# Patient Record
(demographics unavailable — no encounter records)

---

## 2024-10-30 NOTE — HISTORY OF PRESENT ILLNESS
[Hepatitis B Vaccine Given] : Hepatitis B vaccine given [Breast milk] : breast milk [Expressed Breast milk ___oz/feed] : [unfilled] oz of expressed breast milk per feed [Formula ___ oz/feed] : [unfilled] oz of formula per feed [Nirsevimab Given] : Nirsevimab not given [FreeTextEntry7] : discharged 10/23 [FreeTextEntry1] : Term 39.1week male infant born via rpt  to a 34y/o  mother. Maternal history of D+C, umbilical hernia repair, and csection x4. Apgars were 9 and 9 at 1 and 5 minutes respectively. On physical exam, infant was noted to have a skin tag to below left breast. Hepatitis B vaccine was given. Beyfortus was declined. Passed hearing B/L. Maternal blood type A+. Transcutaneous bilirubin at 24 hrs was 5.0, PT 12.8. Prenatal labs were negative, except GBS positive, ROM at delivery. Maternal UDS negative. Congenital heart disease screening was passed. [facility] [place] Screening #[phone].  G6PD 16.7, wnl.  Circumcision done.  beyfortus?  Ht: 51cm/61% Wt: 3220g/35% HC: 33cm/13% DC Wt:  3140g

## 2024-11-22 NOTE — DISCUSSION/SUMMARY
[FreeTextEntry1] : BRADLEY is a 1 month  M with acute uri.   URI: Recommend supportive care including antipyretics, fluids, OTC cough/cold medications if age-appropriate, and nasal saline followed by nasal suction. Alarming breathing patterns such as retractions discussed and educational video reviewed. Patient to be brought to the ED if has persistent decreased oral intake, decrease in wet diapers, fever >100.4F or becomes patient becomes lethargic or changed in mental status and alertness. To note if fever > 5 days must be seen immediately either in clinic or in ED.   Caretaker expressed understanding of the plan and agrees. No other concerns or questions today.

## 2024-11-22 NOTE — HISTORY OF PRESENT ILLNESS
[FreeTextEntry6] : few day hx of runny nose and congestion no fever siblings with uri symptoms Good activity, hydration and po intake. Acting at baseline otherwise.

## 2024-12-09 NOTE — PHYSICAL EXAM
[Alert] : alert [Normocephalic] : normocephalic [Flat Open Anterior [place]] : flat open anterior fontanelle [PERRL] : PERRL [Red Reflex Bilateral] : red reflex bilateral [Normally Placed Ears] : normally placed ears [Auricles Well Formed] : auricles well formed [Nares Patent] : nares patent [Palate Intact] : palate intact [Uvula Midline] : uvula midline [Supple, full passive range of motion] : supple, full passive range of motion [Symmetric Chest Rise] : symmetric chest rise [Clear to Auscultation Bilaterally] : clear to auscultation bilaterally [Regular Rate and Rhythm] : regular rate and rhythm [S1, S2 present] : S1, S2 present [+2 Femoral Pulses] : +2 femoral pulses [Soft] : soft [Bowel Sounds] : bowel sounds present [Normal external genitailia] : normal external genitalia [Circumcised] : circumcised [Central Urethral Opening] : central urethral opening [Testicles Descended Bilaterally] : testicles descended bilaterally [Normally Placed] : normally placed [No Abnormal Lymph Nodes Palpated] : no abnormal lymph nodes palpated [Symmetric Flexed Extremities] : symmetric flexed extremities [Startle Reflex] : startle reflex present [Suck Reflex] : suck reflex present [Rooting] : rooting reflex present [Palmar Grasp] : palmar grasp reflex present [Plantar Grasp] : plantar grasp reflex present [Symmetric [place]] : symmetric [place] [Acute Distress] : no acute distress [Preauricular Sinus Tract] : no preauricular sinus tract [Discharge] : no discharge [Palpable Masses] : no palpable masses [Murmurs] : no murmurs [Tender] : nontender [Distended] : not distended [Hepatomegaly] : no hepatomegaly [Splenomegaly] : no splenomegaly [Bullock-Ortolani] : negative Bullock-Ortolani [Spinal Dimple] : no spinal dimple [Tuft of Hair] : no tuft of hair [Jaundice] : no jaundice [Rash and/or lesion present] : no rash/lesion [FreeTextEntry9] : reducible umbilical hernia

## 2024-12-09 NOTE — DISCUSSION/SUMMARY
[Anticipatory Guidance Given] : Anticipatory guidance addressed as per the history of present illness section [Parental Well-Being] : parental well-being [Family Adjustment] : family adjustment [Feeding Routines] : feeding routines [Infant Adjustment] : infant adjustment [Safety] : safety [FreeTextEntry1] : 1 month old F presenting for HCM. Growth and development normal. Maternal depression screen passed. Immunizations UTD.  - Routine care & anticipatory guidance given - Continue ad joseph feeds - RTC for 2 month old HCM and prn  Caretaker expressed understanding of the plan and agrees. All questions were answered.

## 2024-12-09 NOTE — HISTORY OF PRESENT ILLNESS
[Mother] : mother [Breast milk] : breast milk [Formula ___ oz/feed] : [unfilled] oz of formula per feed [Normal] : Normal [In Bassinet/Crib] : sleeps in bassinet/crib [On back] : sleeps on back [No] : No cigarette smoke exposure [Rear facing car seat in back seat] : Rear facing car seat in back seat [Smoke Detectors] : Smoke detectors at home. [NO] : No [Pacifier use] : Pacifier use [Hours between feeds ___] : Child is fed every [unfilled] hours [Vitamins ___] : Patient takes [unfilled] vitamins daily [Co-sleeping] : no co-sleeping [Loose bedding, pillow, toys, and/or bumpers in crib] : no loose bedding, pillow, toys, and/or bumpers in crib [Exposure to electronic nicotine delivery system] : No exposure to electronic nicotine delivery system

## 2024-12-16 NOTE — PHYSICAL EXAM
[Congestion] : congestion [Transmitted Upper Airway Sounds] : transmitted upper airway sounds [NL] : warm, clear [FreeTextEntry7] : no increased WOB, no respiratory distress

## 2024-12-16 NOTE — HISTORY OF PRESENT ILLNESS
[de-identified] : sick [FreeTextEntry6] : 56d/o M p/w cough since yesterday.  No increased WOB, retractions, flaring, cyanosis.  Mom trying saline nebs and nasal saline with suction.  There was an outbreak of RSV at brother's  and mom would like Alimamy tested.

## 2024-12-16 NOTE — HISTORY OF PRESENT ILLNESS
[de-identified] : sick [FreeTextEntry6] : 56d/o M p/w cough since yesterday.  No increased WOB, retractions, flaring, cyanosis.  Mom trying saline nebs and nasal saline with suction.  There was an outbreak of RSV at brother's  and mom would like Alimamy tested.

## 2024-12-16 NOTE — DISCUSSION/SUMMARY
[FreeTextEntry1] : Rapid RSV is positive.  Lungs are clear today with no respiratory distress.  Saline nebs and nasal saline with suction.  Must return for any signs of worsening or persist - Return precautions reviewed. Patient to seek medical attention in ED if has decreased oral intake, decrease in wet diapers/voids, fever >100.4F, difficulty breathing, becomes lethargic, or has a change in mental status or alertness. To note if fever > 5 days must be seen immediately either in clinic or in ED. - Return/ED precautions given.  RTC routine and prn.  Caretaker expressed understanding and all questions answered.

## 2025-01-06 NOTE — PHYSICAL EXAM
[Alert] : alert [Normocephalic] : normocephalic [Flat Open Anterior [place]] : flat open anterior fontanelle [PERRL] : PERRL [Red Reflex Bilateral] : red reflex bilateral [Normally Placed Ears] : normally placed ears [Auricles Well Formed] : auricles well formed [Clear Tympanic membranes] : clear tympanic membranes [Light reflex present] : light reflex present [Bony landmarks visible] : bony landmarks visible [Nares Patent] : nares patent [Palate Intact] : palate intact [Uvula Midline] : uvula midline [Supple, full passive range of motion] : supple, full passive range of motion [Symmetric Chest Rise] : symmetric chest rise [Clear to Auscultation Bilaterally] : clear to auscultation bilaterally [Regular Rate and Rhythm] : regular rate and rhythm [S1, S2 present] : S1, S2 present [+2 Femoral Pulses] : +2 femoral pulses [Soft] : soft [Bowel Sounds] : bowel sounds present [Normal external genitailia] : normal external genitalia [Circumcised] : circumcised [Central Urethral Opening] : central urethral opening [Testicles Descended Bilaterally] : testicles descended bilaterally [Normally Placed] : normally placed [No Abnormal Lymph Nodes Palpated] : no abnormal lymph nodes palpated [Symmetric Flexed Extremities] : symmetric flexed extremities [Startle Reflex] : startle reflex present [Suck Reflex] : suck reflex present [Rooting] : rooting reflex present [Palmar Grasp] : palmar grasp reflex present [Plantar Grasp] : plantar grasp reflex present [Symmetric [place]ico[place]] : symmetric [place] [Acute Distress] : no acute distress [Discharge] : no discharge [Palpable Masses] : no palpable masses [Murmurs] : no murmurs [Tender] : nontender [Distended] : not distended [Hepatomegaly] : no hepatomegaly [Splenomegaly] : no splenomegaly [Bullock-Ortolani] : negative Bullock-Ortolani [Spinal Dimple] : no spinal dimple [Tuft of Hair] : no tuft of hair [Rash and/or lesion present] : no rash/lesion [FreeTextEntry9] : reducible umbilical hernia

## 2025-01-06 NOTE — HISTORY OF PRESENT ILLNESS
[Mother] : mother [Breast milk] : breast milk [Formula ___ oz/feed] : [unfilled] oz of formula per feed [Vitamins ___] : Patient takes [unfilled] vitamins daily [Normal] : Normal [In Bassinet/Crib] : sleeps in bassinet/crib [On back] : sleeps on back [Pacifier use] : Pacifier use [No] : No cigarette smoke exposure [Rear facing car seat in back seat] : Rear facing car seat in back seat [Smoke Detectors] : Smoke detectors at home. [NO] : No [Carbon Monoxide Detectors] : Carbon monoxide detectors at home [Co-sleeping] : no co-sleeping [Loose bedding, pillow, toys, and/or bumpers in crib] : no loose bedding, pillow, toys, and/or bumpers in crib [Exposure to electronic nicotine delivery system] : No exposure to electronic nicotine delivery system [FreeTextEntry7] : Recently sick with RSV bronchiolitis.  They went to ED 12/19 and CXR was done showed hyperinflation, duoneb given and improved.  He was getting zarbees and later prescribed albuterol.  He is much better now.  Per mom he never stopped eating or drinking. [de-identified] : no major issues

## 2025-01-06 NOTE — DISCUSSION/SUMMARY
[Anticipatory Guidance Given] : Anticipatory guidance addressed as per the history of present illness section [Parental (Maternal) Well-Being] : parental (maternal) well-being [Infant-Family Synchrony] : infant-family synchrony [Nutritional Adequacy] : nutritional adequacy [Infant Behavior] : infant behavior [Safety] : safety [] : The components of the vaccine(s) to be administered today are listed in the plan of care. The disease(s) for which the vaccine(s) are intended to prevent and the risks have been discussed with the caretaker.  The risks are also included in the appropriate vaccination information statements which have been provided to the patient's caregiver.  The caregiver has given consent to vaccinate. [FreeTextEntry1] : 2 month old F presenting for HCM. Growth and development normal. PE w/reducible umbilical hernia. Maternal depression screen passed. Immunizations due.  NBS neg.  Also ED follow up of RSV bronchiolitis.  Improved.  Well appearing on exam.  - Routine care & anticipatory guidance given - Vaccines given:  Vaxelis, PCV, Rota - Post vaccine care discussed & potential side effects reviewed - Tylenol every 4 hours prn for fever or pain - Continue ad joseph feeds - RTC for 4 month old HCM and prn  Caretaker expressed understanding of the plan and agrees. All questions were answered.

## 2025-02-24 NOTE — PHYSICAL EXAM
[Alert] : alert [Normocephalic] : normocephalic [Flat Open Anterior [place]] : flat open anterior fontanelle [Red Reflex] : red reflex bilateral [PERRL] : PERRL [Normally Placed Ears] : normally placed ears [Auricles Well Formed] : auricles well formed [Clear Tympanic membranes] : clear tympanic membranes [Light reflex present] : light reflex present [Bony landmarks visible] : bony landmarks visible [Nares Patent] : nares patent [Palate Intact] : palate intact [Uvula Midline] : uvula midline [Symmetric Chest Rise] : symmetric chest rise [Clear to Auscultation Bilaterally] : clear to auscultation bilaterally [Regular Rate and Rhythm] : regular rate and rhythm [S1, S2 present] : S1, S2 present [+2 Femoral Pulses] : (+) 2 femoral pulses [Soft] : soft [Bowel Sounds] : bowel sounds present [Central Urethral Opening] : central urethral opening [Testicles Descended] : testicles descended bilaterally [Patent] : patent [Normally Placed] : normally placed [No Abnormal Lymph Nodes Palpated] : no abnormal lymph nodes palpated [Startle Reflex] : startle reflex present [Plantar Grasp] : plantar grasp reflex present [Symmetric Nico[place]] : symmetric [place] [Acute Distress] : no acute distress [Discharge] : no discharge [Palpable Masses] : no palpable masses [Murmurs] : no murmurs [Tender] : nontender [Distended] : nondistended [Hepatomegaly] : no hepatomegaly [Splenomegaly] : no splenomegaly [Bullock-Ortolani] : negative Bullock-Ortolani [Allis Sign] : negative Allis sign [Spinal Dimple] : no spinal dimple [Tuft of Hair] : no tuft of hair [Rash or Lesions] : no rash/lesions [FreeTextEntry9] : reducible umbilical hernia [de-identified] : right inner arm small hyperpigmentation

## 2025-02-24 NOTE — DISCUSSION/SUMMARY
[Anticipatory Guidance Given] : Anticipatory guidance addressed as per the history of present illness section [Family Functioning] : family functioning [Nutritional Adequacy and Growth] : nutritional adequacy and growth [Infant Development] : infant development [Oral Health] : oral health [Safety] : safety [] : The components of the vaccine(s) to be administered today are listed in the plan of care. The disease(s) for which the vaccine(s) are intended to prevent and the risks have been discussed with the caretaker.  The risks are also included in the appropriate vaccination information statements which have been provided to the patient's caregiver.  The caregiver has given consent to vaccinate. [FreeTextEntry1] : 4 month old M presenting for HCM. Growth and development normal. PE w/reducible umbilical hernia. Maternal depression screen passed. Immunizations due today   - Routine care & anticipatory guidance given - Vaccines given: Pentacel, Prevnar & Rota - Post vaccine care discussed & potential side effects reviewed - Tylenol every 4 hours prn for fever or pain - Continue ad joseph feeds - No head lag: counseled on intro to solids starting with infant cereal, may slowly introduce stage 1 baby foods - Choking hazards reviewed, no cows milk or honey until after age 1 year old - Constipation:  Trial 1/2 oz prune juice + 1/2 oz water. RTC if baby starts vomiting, having abdominal distention or pain with touch. - Hernia precautions - RTC for 6 month old HCM and prn   Recommend breastfeeding, 8-12 feedings per day. Mother should continue prenatal vitamins and avoid alcohol. If formula is needed, recommend iron-fortified formulations, 2-4 oz every 3-4 hrs. Cereal may be introduced using a spoon and bowl. When in car, patient should be in rear-facing car seat in back seat. Put baby to sleep on back, in own crib with no loose or soft bedding. Lower crib mattress. Help baby to maintain sleep and feeding routines. May offer pacifier if needed. Continue tummy time when awake.   Caretaker expressed understanding of the plan and agrees. All questions were answered.

## 2025-02-24 NOTE — HISTORY OF PRESENT ILLNESS
[Mother] : mother [Normal] : Normal [In Bassinet/Crib] : sleeps in bassinet/crib [On back] : sleeps on back [Pacifier use] : Pacifier use [No] : No cigarette smoke exposure [Rear facing car seat in back seat] : Rear facing car seat in back seat [Carbon Monoxide Detectors] : Carbon monoxide detectors at home [Smoke Detectors] : Smoke detectors at home. [NO] : No [Co-sleeping] : no co-sleeping [Loose bedding, pillow, toys, and/or bumpers in crib] : no loose bedding, pillow, toys, and/or bumpers in crib [Exposure to electronic nicotine delivery system] : No exposure to electronic nicotine delivery system [de-identified] : breastmilk dried up, doing formula now [FreeTextEntry8] : w/some constipation

## 2025-04-21 NOTE — PHYSICAL EXAM
[Alert] : alert [Normocephalic] : normocephalic [Flat Open Anterior [place]] : flat open anterior fontanelle [Red Reflex] : red reflex bilateral [PERRL] : PERRL [Normally Placed Ears] : normally placed ears [Auricles Well Formed] : auricles well formed [Nares Patent] : nares patent [Palate Intact] : palate intact [Uvula Midline] : uvula midline [Supple, full passive range of motion] : supple, full passive range of motion [Symmetric Chest Rise] : symmetric chest rise [Clear to Auscultation Bilaterally] : clear to auscultation bilaterally [Regular Rate and Rhythm] : regular rate and rhythm [S1, S2 present] : S1, S2 present [Soft] : soft [Bowel Sounds] : bowel sounds present [Central Urethral Opening] : central urethral opening [Testicles Descended] : testicles descended bilaterally [Patent] : patent [Normally Placed] : normally placed [No Abnormal Lymph Nodes Palpated] : no abnormal lymph nodes palpated [Symmetric Buttocks Creases] : symmetric buttocks creases [Plantar Grasp] : plantar grasp reflex present [Cranial Nerves Grossly Intact] : cranial nerves grossly intact [Acute Distress] : no acute distress [Discharge] : no discharge [Tooth Eruption] : no tooth eruption [Palpable Masses] : no palpable masses [Murmurs] : no murmurs [Tender] : nontender [Distended] : nondistended [Hepatomegaly] : no hepatomegaly [Splenomegaly] : no splenomegaly [Bullock-Ortolani] : negative Bullock-Ortolani [Allis Sign] : negative Allis sign [Spinal Dimple] : no spinal dimple [Tuft of Hair] : no tuft of hair [Rash or Lesions] : no rash/lesions [de-identified] : right inner arm small hyperpigmentation [de-identified] : reducible umbilical hernia (improving)

## 2025-04-21 NOTE — HISTORY OF PRESENT ILLNESS
[Mother] : mother [Formula ___ oz/feed] : [unfilled] oz of formula per feed [Hours between feeds ___] : Child is fed every [unfilled] hours [Fruits] : fruits [Cereal] : cereal [Normal] : Normal [___ voids per day] : [unfilled] voids per day [Frequency of stools: ___] : Frequency of stools: [unfilled]  stools [Dark green] : dark green [Yellow] : yellow [Firm] : firm consistency [In Bassinet/Crib] : sleeps in bassinet/crib [On back] : sleeps on back [Sleeps 12-16 hours per 24 hours (including naps)] : sleeps 12-16 hours per 24 hours (including naps) [Tummy time] : tummy time [No] : No cigarette smoke exposure [Water heater temperature set at <120 degrees F] : Water heater temperature set at <120 degrees F [Rear facing car seat in back seat] : Rear facing car seat in back seat [Carbon Monoxide Detectors] : Carbon monoxide detectors at home [Smoke Detectors] : Smoke detectors at home. [NO] : No [Loose bedding, pillow, toys, and/or bumpers in crib] : no loose bedding, pillow, toys, and/or bumpers in crib [Exposure to electronic nicotine delivery system] : No exposure to electronic nicotine delivery system

## 2025-04-21 NOTE — DEVELOPMENTAL MILESTONES
[Normal Development] : Normal Development [Pats or smiles at reflection] : pats or smiles at reflection [Begins to turn when name called] : begins to turn when name called [Babbles] : babbles [Rolls over prone to supine] : rolls over prone to supine [Sits briefly without support] : sits briefly without support [Reaches for object and transfers] : reaches for object and transfers [Rakes small object with 4 fingers] : rakes small object with 4 fingers [[place] small object on surface] : bangs small object on surface [Passed] : passed [FreeTextEntry2] : 0

## 2025-04-21 NOTE — DISCUSSION/SUMMARY
[Family Functioning] : family functioning [Nutrition and Feeding] : nutrition and feeding [Infant Development] : infant development [Oral Health] : oral health [Safety] : safety [] : The components of the vaccine(s) to be administered today are listed in the plan of care. The disease(s) for which the vaccine(s) are intended to prevent and the risks have been discussed with the caretaker.  The risks are also included in the appropriate vaccination information statements which have been provided to the patient's caregiver.  The caregiver has given consent to vaccinate. [FreeTextEntry1] : 6 month old M presenting for [facility]. Growth and development normal. Maternal depression screen passed. Immunizations due.   - Routine care & anticipatory guidance given - Vaccines given:  Vaxelis, PCV, Rota - Post vaccine care discussed & potential side effects reviewed - Tylenol every 4 hours prn for fever or pain - Continue ad joseph feeds and intro to solids, may advance to stage 2 baby foods - Choking hazards reviewed, no cows milk or honey until after age 1 year old - RTC for 9 month old HCM and prn   Caretaker expressed understanding of the plan and agrees. All questions were answered.

## 2025-05-29 NOTE — HISTORY OF PRESENT ILLNESS
[de-identified] : sick [FreeTextEntry6] : c/o cough, runny nose x2d no fever some posttussive emesis toelrating PO taking zarbees and saline nebs siblings sick

## 2025-05-29 NOTE — DISCUSSION/SUMMARY
[FreeTextEntry1] : URI RSV negative Recommend supportive care including antipyretics, fluids, OTC cough/cold medications if age-appropriate, and nasal saline followed by nasal suction. Return if symptoms worsen or persist. - Return precautions reviewed including for worsening or persisting symptoms. Patient to seek medical attention in ED if has decreased oral intake, decrease in wet diapers/voids, fever >100.4F, difficulty breathing, becomes lethargic, or has a change in mental status or alertness. To note if fever > 5 days must be seen immediately either in clinic or in ED. - Return/ED precautions given.  RTC routine and prn.  Caretaker expressed understanding and all questions answered.

## 2025-05-29 NOTE — HISTORY OF PRESENT ILLNESS
[de-identified] : sick [FreeTextEntry6] : c/o cough, runny nose x2d no fever some posttussive emesis toelrating PO taking zarbees and saline nebs siblings sick